# Patient Record
Sex: FEMALE | Race: WHITE | URBAN - METROPOLITAN AREA
[De-identification: names, ages, dates, MRNs, and addresses within clinical notes are randomized per-mention and may not be internally consistent; named-entity substitution may affect disease eponyms.]

---

## 2019-01-18 PROBLEM — Z90.79 S/P TOTAL HYSTERECTOMY AND BSO (BILATERAL SALPINGO-OOPHORECTOMY): Status: ACTIVE | Noted: 2019-01-18

## 2019-01-18 PROBLEM — Z90.710 S/P TOTAL HYSTERECTOMY AND BSO (BILATERAL SALPINGO-OOPHORECTOMY): Status: ACTIVE | Noted: 2019-01-18

## 2019-01-18 PROBLEM — Z90.722 S/P TOTAL HYSTERECTOMY AND BSO (BILATERAL SALPINGO-OOPHORECTOMY): Status: ACTIVE | Noted: 2019-01-18

## 2019-01-18 PROBLEM — Z92.3 HISTORY OF RADIATION THERAPY: Status: ACTIVE | Noted: 2019-01-18

## 2019-01-18 PROBLEM — Z90.710 HISTORY OF ROBOT-ASSISTED LAPAROSCOPIC HYSTERECTOMY: Status: ACTIVE | Noted: 2019-01-18

## 2019-01-18 PROBLEM — C54.1 ENDOMETRIAL CANCER (HCC): Status: ACTIVE | Noted: 2019-01-18

## 2019-03-13 PROCEDURE — 87086 URINE CULTURE/COLONY COUNT: CPT | Performed by: UROLOGY

## 2019-03-13 PROCEDURE — 81001 URINALYSIS AUTO W/SCOPE: CPT | Performed by: UROLOGY

## 2022-01-06 ENCOUNTER — TELEPHONE (OUTPATIENT)
Dept: ORTHOPEDICS CLINIC | Facility: CLINIC | Age: 67
End: 2022-01-06

## 2022-01-06 DIAGNOSIS — M25.562 LEFT KNEE PAIN, UNSPECIFIED CHRONICITY: Primary | ICD-10-CM

## 2022-01-06 NOTE — TELEPHONE ENCOUNTER
Reviewed patients chart, xray orders are required. Order placed for LT knee xrays.  Please contact patient advise to arrive 30 mins prior to patients appt to complete x-ray order and schedule patients xray appt-Thank you

## 2022-01-06 NOTE — TELEPHONE ENCOUNTER
Future Appointments   Date Time Provider Mervin Baptiste   1/19/2022  1:00 PM Joycelyn Taylor MD [facility]     Patient is coming for LT Knee Fall. No prior images done yet. Please advise if views are needed for this appt. Thanks.     P

## 2022-01-19 ENCOUNTER — HOSPITAL ENCOUNTER (OUTPATIENT)
Dept: GENERAL RADIOLOGY | Age: 67
Discharge: HOME | End: 2022-01-19
Attending: ORTHOPAEDIC SURGERY
Payer: MEDICARE

## 2022-01-19 ENCOUNTER — OFFICE VISIT (OUTPATIENT)
Dept: ORTHOPEDICS CLINIC | Facility: CLINIC | Age: 67
End: 2022-01-19
Payer: MEDICARE

## 2022-01-19 VITALS — WEIGHT: 185 LBS | BODY MASS INDEX: 32.37 KG/M2 | HEIGHT: 63.5 IN

## 2022-01-19 DIAGNOSIS — M25.561 PAIN AND SWELLING OF RIGHT KNEE: Primary | ICD-10-CM

## 2022-01-19 DIAGNOSIS — M25.562 LEFT KNEE PAIN, UNSPECIFIED CHRONICITY: ICD-10-CM

## 2022-01-19 DIAGNOSIS — M25.561 PAIN AND SWELLING OF RIGHT KNEE: ICD-10-CM

## 2022-01-19 DIAGNOSIS — M25.461 PAIN AND SWELLING OF RIGHT KNEE: Primary | ICD-10-CM

## 2022-01-19 DIAGNOSIS — T14.8XXA MUSCLE TEAR: ICD-10-CM

## 2022-01-19 DIAGNOSIS — M25.461 PAIN AND SWELLING OF RIGHT KNEE: ICD-10-CM

## 2022-01-19 PROCEDURE — 73562 X-RAY EXAM OF KNEE 3: CPT | Performed by: ORTHOPAEDIC SURGERY

## 2022-01-19 PROCEDURE — 73564 X-RAY EXAM KNEE 4 OR MORE: CPT | Performed by: ORTHOPAEDIC SURGERY

## 2022-01-19 PROCEDURE — 99203 OFFICE O/P NEW LOW 30 MIN: CPT | Performed by: ORTHOPAEDIC SURGERY
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
## 2022-05-10 ENCOUNTER — TELEPHONE (OUTPATIENT)
Dept: ORTHOPEDICS CLINIC | Facility: CLINIC | Age: 67
End: 2022-05-10

## 2022-05-10 NOTE — TELEPHONE ENCOUNTER
Spoke with pt, advised MRI order faxed to [facility] Scheduling dept, [phone]. Noted by referrals dept that auth not needed.

## 2022-06-15 ENCOUNTER — OFFICE VISIT (OUTPATIENT)
Dept: ORTHOPEDICS CLINIC | Facility: CLINIC | Age: 67
End: 2022-06-15
Payer: MEDICARE

## 2022-06-15 VITALS — HEIGHT: 63.5 IN | WEIGHT: 185 LBS | BODY MASS INDEX: 32.37 KG/M2

## 2022-06-15 DIAGNOSIS — M22.41 PATELLA, CHONDROMALACIA, RIGHT: Primary | ICD-10-CM

## 2022-06-15 DIAGNOSIS — M94.261 CHONDROMALACIA OF RIGHT KNEE: ICD-10-CM

## 2022-06-15 DIAGNOSIS — T14.8XXA MUSCLE TEAR: ICD-10-CM

## 2022-06-15 PROCEDURE — 99214 OFFICE O/P EST MOD 30 MIN: CPT | Performed by: ORTHOPAEDIC SURGERY
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
## 2022-06-23 ENCOUNTER — TELEPHONE (OUTPATIENT)
Dept: ORTHOPEDICS CLINIC | Facility: CLINIC | Age: 67
End: 2022-06-23

## 2022-08-31 ENCOUNTER — TELEPHONE (OUTPATIENT)
Dept: ORTHOPEDICS CLINIC | Facility: CLINIC | Age: 67
End: 2022-08-31

## 2023-04-03 ENCOUNTER — TELEPHONE (OUTPATIENT)
Dept: ORTHOPEDICS CLINIC | Facility: CLINIC | Age: 68
End: 2023-04-03

## 2023-04-06 ENCOUNTER — TELEPHONE (OUTPATIENT)
Dept: ORTHOPEDICS CLINIC | Facility: CLINIC | Age: 68
End: 2023-04-06

## 2023-04-06 NOTE — TELEPHONE ENCOUNTER
Called and spoke with Bright Pablo in regards to getting her scheduled for surgery. She has opted to proceed with surgery on 6/19/23. I did go over the surgical protocol with her as well as inform her that she would need to see her pcp for surgical clearance. I also informed her that she will need to see Dr Estefana Spurling prior to her surgical date. She has been scheduled to see him on 5/10.

## 2023-04-06 NOTE — TELEPHONE ENCOUNTER
Patient is following-up again to set-up her surgery, she said this is urgent and patient is anxious. She was told by Dr. Rock Littlejohn to call and set it up. Please advise, Thanks.     Patient can be reached at [phone]

## 2023-04-17 NOTE — TELEPHONE ENCOUNTER
SURGERY SCHEDULING SHEET    Prerna Treadwell  3/28/1955  [id number]    Procedure: Right  Knee Arthroscopy and Chondroplasty (89542) patella     Diagnosis:    Patella, chondromalacia, right M22.41     Chondromalacia of right knee M94.261      Anesthesia: General    Length of Surgery: 1 hr    Disposition: Outpatient    Special Equipment: NONE    Assist: Yes ESTEE JONES    Pre-op Testing: PER ANESTHESIA GUIDELINES    Clearance: HISTORY AND PHYSICAL    Post op: Day after surgery    Ankush Qureshi MD  [address] Orthopedic Surgery  Phone: [phone]  Fax: [phone]

## 2023-06-09 ENCOUNTER — OFFICE VISIT (OUTPATIENT)
Dept: ORTHOPEDICS CLINIC | Facility: CLINIC | Age: 68
End: 2023-06-09
Payer: MEDICARE

## 2023-06-09 VITALS — WEIGHT: 185 LBS | BODY MASS INDEX: 32.37 KG/M2 | HEIGHT: 63.5 IN

## 2023-06-09 DIAGNOSIS — M22.41 PATELLA, CHONDROMALACIA, RIGHT: Primary | ICD-10-CM

## 2023-06-09 DIAGNOSIS — M94.261 CHONDROMALACIA OF RIGHT KNEE: ICD-10-CM

## 2023-06-09 PROCEDURE — 99214 OFFICE O/P EST MOD 30 MIN: CPT | Performed by: ORTHOPAEDIC SURGERY

## 2023-06-09 NOTE — PROGRESS NOTES
Patient is a 27-year-old white female here for preoperative discussion regarding her upcoming surgery. I last saw the patient about a year ago and at that time had recommended considering an arthroscopy of her knee because of some persistent pain. Is my diagnosis at that time that she had some posttraumatic patellar chondromalacia that might benefit from an arthroscopy. Patient states that she continues to have some pain of the knee and also a feeling of giving out or giving way of the knee. Previous recommendations were for an arthroscopy with chondroplasty of the patella and possible lateral retinacular release. Patient has had previous trauma to the right knee on the medial aspect where she had significant scarring due to the an injury that occurred quite a while ago. Patient's exam shows her to have significant scarring on the anteromedial aspect of the right knee. Patella is noted to track fairly well. There is some mild crepitation of the patellofemoral joint. Ligaments are stable. Lachman's negative. Range of motion shows near full extension and flexion about 130 degrees. Gait is nonantalgic. Impression is that of chondromalacia of the patella posttraumatic right knee. Second impression is that of chondromalacia medial femoral condyle right knee. Third impression is that of mild lateral patellar subluxation of the right knee. Recommendations are to go ahead with an arthroscopy of the right knee. Will address adhesions within the knee as necessary. Might also consider a lateral retinacular release of the knee depending upon intraoperative findings. Patient did have a thought that we were thinking of performing an anterior cruciate ligament reconstruction I advised her that that would not be happening or nor was it necessary. Patient understands and agrees to proceed with surgery.

## 2023-06-16 ENCOUNTER — ANESTHESIA EVENT (OUTPATIENT)
Dept: SURGERY | Facility: HOSPITAL | Age: 68
End: 2023-06-16
Payer: MEDICARE

## 2023-06-19 ENCOUNTER — HOSPITAL ENCOUNTER (OUTPATIENT)
Facility: HOSPITAL | Age: 68
Setting detail: HOSPITAL OUTPATIENT SURGERY
Discharge: HOME | End: 2023-06-19
Attending: ORTHOPAEDIC SURGERY | Admitting: ORTHOPAEDIC SURGERY
Payer: MEDICARE

## 2023-06-19 ENCOUNTER — ANESTHESIA (OUTPATIENT)
Dept: SURGERY | Facility: HOSPITAL | Age: 68
End: 2023-06-19
Payer: MEDICARE

## 2023-06-19 VITALS
BODY MASS INDEX: 34.71 KG/M2 | RESPIRATION RATE: 18 BRPM | WEIGHT: 198.38 LBS | DIASTOLIC BLOOD PRESSURE: 70 MMHG | SYSTOLIC BLOOD PRESSURE: 148 MMHG | HEIGHT: 63.5 IN | TEMPERATURE: 98 F | OXYGEN SATURATION: 99 % | HEART RATE: 82 BPM

## 2023-06-19 DIAGNOSIS — Z98.890 STATUS POST ARTHROSCOPY OF RIGHT KNEE: Primary | ICD-10-CM

## 2023-06-19 LAB
GLUCOSE BLD-MCNC: 218 MG/DL (ref 70–99)
GLUCOSE BLD-MCNC: 283 MG/DL (ref 70–99)

## 2023-06-19 PROCEDURE — 0SBC4ZZ EXCISION OF RIGHT KNEE JOINT, PERCUTANEOUS ENDOSCOPIC APPROACH: ICD-10-PCS | Performed by: ORTHOPAEDIC SURGERY

## 2023-06-19 PROCEDURE — 82962 GLUCOSE BLOOD TEST: CPT

## 2023-06-19 RX ORDER — HYDROMORPHONE HYDROCHLORIDE 1 MG/ML
0.6 INJECTION, SOLUTION INTRAMUSCULAR; INTRAVENOUS; SUBCUTANEOUS EVERY 5 MIN PRN
Status: DISCONTINUED | OUTPATIENT
Start: 2023-06-19 | End: 2023-06-19

## 2023-06-19 RX ORDER — CEFAZOLIN SODIUM/WATER 2 G/20 ML
2 SYRINGE (ML) INTRAVENOUS ONCE
Status: COMPLETED | OUTPATIENT
Start: 2023-06-19 | End: 2023-06-19

## 2023-06-19 RX ORDER — INSULIN ASPART 100 [IU]/ML
INJECTION, SOLUTION INTRAVENOUS; SUBCUTANEOUS ONCE
Status: COMPLETED | OUTPATIENT
Start: 2023-06-19 | End: 2023-06-19

## 2023-06-19 RX ORDER — DEXTROSE MONOHYDRATE 25 G/50ML
50 INJECTION, SOLUTION INTRAVENOUS
Status: DISCONTINUED | OUTPATIENT
Start: 2023-06-19 | End: 2023-06-19 | Stop reason: HOSPADM

## 2023-06-19 RX ORDER — METOCLOPRAMIDE HYDROCHLORIDE 5 MG/ML
10 INJECTION INTRAMUSCULAR; INTRAVENOUS EVERY 8 HOURS PRN
Status: DISCONTINUED | OUTPATIENT
Start: 2023-06-19 | End: 2023-06-19

## 2023-06-19 RX ORDER — ACETAMINOPHEN 500 MG
1000 TABLET ORAL ONCE AS NEEDED
Status: DISCONTINUED | OUTPATIENT
Start: 2023-06-19 | End: 2023-06-19

## 2023-06-19 RX ORDER — SODIUM CHLORIDE, SODIUM LACTATE, POTASSIUM CHLORIDE, CALCIUM CHLORIDE 600; 310; 30; 20 MG/100ML; MG/100ML; MG/100ML; MG/100ML
INJECTION, SOLUTION INTRAVENOUS CONTINUOUS
Status: DISCONTINUED | OUTPATIENT
Start: 2023-06-19 | End: 2023-06-19

## 2023-06-19 RX ORDER — HYDROCODONE BITARTRATE AND ACETAMINOPHEN 5; 325 MG/1; MG/1
1 TABLET ORAL ONCE AS NEEDED
Status: DISCONTINUED | OUTPATIENT
Start: 2023-06-19 | End: 2023-06-19

## 2023-06-19 RX ORDER — BUPIVACAINE HYDROCHLORIDE 5 MG/ML
INJECTION, SOLUTION EPIDURAL; INTRACAUDAL AS NEEDED
Status: DISCONTINUED | OUTPATIENT
Start: 2023-06-19 | End: 2023-06-19 | Stop reason: HOSPADM

## 2023-06-19 RX ORDER — NALOXONE HYDROCHLORIDE 0.4 MG/ML
80 INJECTION, SOLUTION INTRAMUSCULAR; INTRAVENOUS; SUBCUTANEOUS AS NEEDED
Status: DISCONTINUED | OUTPATIENT
Start: 2023-06-19 | End: 2023-06-19

## 2023-06-19 RX ORDER — KETOROLAC TROMETHAMINE 30 MG/ML
INJECTION, SOLUTION INTRAMUSCULAR; INTRAVENOUS AS NEEDED
Status: DISCONTINUED | OUTPATIENT
Start: 2023-06-19 | End: 2023-06-19 | Stop reason: SURG

## 2023-06-19 RX ORDER — HYDROCODONE BITARTRATE AND ACETAMINOPHEN 5; 325 MG/1; MG/1
1 TABLET ORAL EVERY 4 HOURS PRN
Qty: 30 TABLET | Refills: 0 | Status: SHIPPED | OUTPATIENT
Start: 2023-06-19

## 2023-06-19 RX ORDER — INSULIN ASPART 100 [IU]/ML
4 INJECTION, SOLUTION INTRAVENOUS; SUBCUTANEOUS ONCE
Status: COMPLETED | OUTPATIENT
Start: 2023-06-19 | End: 2023-06-19

## 2023-06-19 RX ORDER — POVIDONE-IODINE 10 MG/G
OINTMENT TOPICAL AS NEEDED
Status: DISCONTINUED | OUTPATIENT
Start: 2023-06-19 | End: 2023-06-19 | Stop reason: HOSPADM

## 2023-06-19 RX ORDER — HYDROCODONE BITARTRATE AND ACETAMINOPHEN 5; 325 MG/1; MG/1
2 TABLET ORAL ONCE AS NEEDED
Status: DISCONTINUED | OUTPATIENT
Start: 2023-06-19 | End: 2023-06-19

## 2023-06-19 RX ORDER — INSULIN ASPART 100 [IU]/ML
INJECTION, SOLUTION INTRAVENOUS; SUBCUTANEOUS
Status: COMPLETED
Start: 2023-06-19 | End: 2023-06-19

## 2023-06-19 RX ORDER — HYDROMORPHONE HYDROCHLORIDE 1 MG/ML
0.4 INJECTION, SOLUTION INTRAMUSCULAR; INTRAVENOUS; SUBCUTANEOUS EVERY 5 MIN PRN
Status: DISCONTINUED | OUTPATIENT
Start: 2023-06-19 | End: 2023-06-19

## 2023-06-19 RX ORDER — METOCLOPRAMIDE HYDROCHLORIDE 5 MG/ML
INJECTION INTRAMUSCULAR; INTRAVENOUS AS NEEDED
Status: DISCONTINUED | OUTPATIENT
Start: 2023-06-19 | End: 2023-06-19 | Stop reason: SURG

## 2023-06-19 RX ORDER — NICOTINE POLACRILEX 4 MG
30 LOZENGE BUCCAL
Status: DISCONTINUED | OUTPATIENT
Start: 2023-06-19 | End: 2023-06-19 | Stop reason: HOSPADM

## 2023-06-19 RX ORDER — HYDROMORPHONE HYDROCHLORIDE 1 MG/ML
0.2 INJECTION, SOLUTION INTRAMUSCULAR; INTRAVENOUS; SUBCUTANEOUS EVERY 5 MIN PRN
Status: DISCONTINUED | OUTPATIENT
Start: 2023-06-19 | End: 2023-06-19

## 2023-06-19 RX ORDER — ONDANSETRON 2 MG/ML
INJECTION INTRAMUSCULAR; INTRAVENOUS AS NEEDED
Status: DISCONTINUED | OUTPATIENT
Start: 2023-06-19 | End: 2023-06-19 | Stop reason: SURG

## 2023-06-19 RX ORDER — ONDANSETRON 2 MG/ML
4 INJECTION INTRAMUSCULAR; INTRAVENOUS EVERY 6 HOURS PRN
Status: DISCONTINUED | OUTPATIENT
Start: 2023-06-19 | End: 2023-06-19

## 2023-06-19 RX ORDER — LABETALOL HYDROCHLORIDE 5 MG/ML
5 INJECTION, SOLUTION INTRAVENOUS EVERY 5 MIN PRN
Status: DISCONTINUED | OUTPATIENT
Start: 2023-06-19 | End: 2023-06-19

## 2023-06-19 RX ORDER — MIDAZOLAM HYDROCHLORIDE 1 MG/ML
1 INJECTION INTRAMUSCULAR; INTRAVENOUS EVERY 5 MIN PRN
Status: DISCONTINUED | OUTPATIENT
Start: 2023-06-19 | End: 2023-06-19

## 2023-06-19 RX ORDER — MEPERIDINE HYDROCHLORIDE 25 MG/ML
12.5 INJECTION INTRAMUSCULAR; INTRAVENOUS; SUBCUTANEOUS AS NEEDED
Status: DISCONTINUED | OUTPATIENT
Start: 2023-06-19 | End: 2023-06-19

## 2023-06-19 RX ORDER — NICOTINE POLACRILEX 4 MG
15 LOZENGE BUCCAL
Status: DISCONTINUED | OUTPATIENT
Start: 2023-06-19 | End: 2023-06-19 | Stop reason: HOSPADM

## 2023-06-19 RX ORDER — ACETAMINOPHEN 500 MG
1000 TABLET ORAL ONCE
Status: DISCONTINUED | OUTPATIENT
Start: 2023-06-19 | End: 2023-06-19 | Stop reason: HOSPADM

## 2023-06-19 RX ORDER — LIDOCAINE HYDROCHLORIDE 10 MG/ML
INJECTION, SOLUTION EPIDURAL; INFILTRATION; INTRACAUDAL; PERINEURAL AS NEEDED
Status: DISCONTINUED | OUTPATIENT
Start: 2023-06-19 | End: 2023-06-19 | Stop reason: SURG

## 2023-06-19 RX ADMIN — METOCLOPRAMIDE HYDROCHLORIDE 10 MG: 5 INJECTION INTRAMUSCULAR; INTRAVENOUS at 10:14:00

## 2023-06-19 RX ADMIN — SODIUM CHLORIDE, SODIUM LACTATE, POTASSIUM CHLORIDE, CALCIUM CHLORIDE: 600; 310; 30; 20 INJECTION, SOLUTION INTRAVENOUS at 10:03:00

## 2023-06-19 RX ADMIN — LIDOCAINE HYDROCHLORIDE 50 MG: 10 INJECTION, SOLUTION EPIDURAL; INFILTRATION; INTRACAUDAL; PERINEURAL at 10:08:00

## 2023-06-19 RX ADMIN — KETOROLAC TROMETHAMINE 30 MG: 30 INJECTION, SOLUTION INTRAMUSCULAR; INTRAVENOUS at 10:55:00

## 2023-06-19 RX ADMIN — SODIUM CHLORIDE, SODIUM LACTATE, POTASSIUM CHLORIDE, CALCIUM CHLORIDE: 600; 310; 30; 20 INJECTION, SOLUTION INTRAVENOUS at 10:59:00

## 2023-06-19 RX ADMIN — CEFAZOLIN SODIUM/WATER 2 G: 2 G/20 ML SYRINGE (ML) INTRAVENOUS at 10:08:00

## 2023-06-19 RX ADMIN — ONDANSETRON 4 MG: 2 INJECTION INTRAMUSCULAR; INTRAVENOUS at 10:55:00

## 2023-06-19 NOTE — H&P
The above referenced H&P by Dr. Isabel Briceño dated 6/8/2023 was reviewed by Aayush Salmeron MD on 6/19/2023, the patient was examined and no significant changes have occurred in the patient's condition since the H&P was performed. Risks, benefits, alternative treatments and consequences of no treatment were discussed. We will proceed with procedure right knee arthroscopy for chondromalacia patella as planned.       Aayush Salmeron MD  6/19/2023  9:44 AM

## 2023-06-19 NOTE — OPERATIVE REPORT
[facility]    PATIENT'S NAME: Larry Thomas   ATTENDING PHYSICIAN: Dennie Malay, M.D. OPERATING PHYSICIAN: Dennie Malay, M.D. PATIENT ACCOUNT#:   [de-identified]    LOCATION:  [address] [place] [address] 10  MEDICAL RECORD #:   [id number]       YOB: 1955  ADMISSION DATE:       06/19/2023      OPERATION DATE:  06/19/2023    OPERATIVE REPORT    PREOPERATIVE DIAGNOSIS:  Medial meniscus tear, chondromalacia of medial femoral condyle and patellofemoral joint, right knee. POSTOPERATIVE DIAGNOSIS:  Medial and lateral meniscal tears; chondromalacia of the medial femoral condyle, patella and trochlea; suprapatellar plica; lateral meniscus tear; right knee. PROCEDURE:  Arthroscopy of the right knee, endoscopic partial medial and lateral meniscectomies, chondroplasty of the medial femoral condyle, chondroplasty of the patella and trochlea, debridement of suprapatellar plica. ASSISTANT:  Janie Rodríguez PA-C     ANESTHESIA:  General.      OPERATIVE TECHNIQUE:  Following the administration of general anesthesia, tourniquet and leg de souza were placed around the proximal right thigh, and the left leg was placed in a well leg de souza. The right leg was exsanguinated with a Reza bandage, and the tourniquet was inflated to pressure 300 mmHg. Using a #11 blade, standard arthroscopic skin incisions were made, and a semi-blunt trocar and inflow cannula were inserted through the superomedial incision to the knee joint. The knee was inflated with irrigation fluid. The arthroscopic cannula and semi-blunt trocar were introduced through the anterolateral incision to the knee joint. The arthroscope was then inserted and the knee was examined beginning at the suprapatellar pouch. The pouch itself was free of loose bodies. The suprapatellar pouch did demonstrate a moderately broad suprapatellar plica. The patella and trochlea demonstrated diffuse chondromalacia, grade 2 to 3.   No subchondral bone was exposed. The gutters were free of loose bodies. No significant shelf was present. The medial joint line was examined, and a spinal needle was placed superior to the anterior horn of the medial meniscus. A #11 blade was used to make a 7 mm skin incision at this site. Semi-blunt trocar was used to establish a portal, and a probe was inserted. The medial meniscus was examined and demonstrated a horizontal cleavage tear of the posterior horn. The adjacent articular cartilage demonstrated some chondromalacia in an area about of 1 cm to 1.5 cm in diameter. Surrounding articular cartilage was intact. The intercondylar notch was examined. The anterior and posterior cruciate ligaments were intact. The lateral joint line was then examined, and the lateral femoral condyle and tibial plateau were intact. There was a tear of the free edge of the meniscus laterally. This was more of a frayed edge. The basket forceps were used at this time to perform a partial medial meniscectomy, removing a portion of the posterior horn of the meniscus and then beveled into the normal contour of the meniscus in the midportion. Additionally, the femoral condyle was also abraded with a shaver to stable margins. No subchondral bone was exposed. Attention was then paid to the lateral meniscus, and the meniscus was debrided through the anteromedial approach. After this was completed, attention was then paid to the patella and trochlea, and these were debrided through anteromedial and anterolateral approaches. No subchondral bone was exposed. Suprapatellar plica was then debrided as well with the shaver. The knee was then irrigated free of any loose debris. The wounds were closed with 3-0 nylon sutures in a horizontal mattress fashion. The wounds were covered with the Betadine ointment, Adaptic gauze, 4 x 4 gauze, ABD dressing, sterile Webril, cotton roll, and Ace bandage.   Tourniquet was deflated after less than 1 hour of tourniquet time. No complications. No specimens. Blood loss, 10 mL. IV fluids, all crystalloid. The patient transferred to postanesthesia recovery room in stable condition.      Dictated By Charlene Mike M.D.  d: 06/19/2023 11:04:41  t: 06/19/2023 14:04:39  [facility] [id number]  QB/

## 2023-06-19 NOTE — BRIEF OP NOTE
Pre-Operative Diagnosis: Patella, chondromalacia, right [M22.41]  Chondromalacia of right knee [M94.261]     Post-Operative Diagnosis: Right knee medial and lateral meniscus tears, chondromalacia medial femoral condyle, patella, and trochlea and suprapatellar plica      Procedure Performed:   RIGHT KNEE PARTIAL MEDIAL AND LATERAL MENISCECTOMY, CHONDROPLASTY MEDIAL FEMORAL CONDYLE, PATELLA AND TROCHLEA, AND DEBRIDEMENT OF SUPRAPATELLAR PLICA     Surgeon(s) and Role:     Demi Keller MD - Primary    Assistant(s):  PA: Hiwot Severino PA-C and Ladi FONTANEZ     Surgical Findings: See operative report      Specimen: na      Estimated Blood Loss: Blood Output: 10 mL (6/19/2023 10:56 AM)        Mir Bradshaw PA-C  6/19/2023  11:09 AM

## 2023-06-19 NOTE — ANESTHESIA PROCEDURE NOTES
Airway  Date/Time: 6/19/2023 10:09 AM  Urgency: elective    Airway not difficult    General Information and Staff    Patient location during procedure: OR  Anesthesiologist: Sage Bartholomew MD  Resident/CRNA: Gabbi Fletcher CRNA  Performed: CRNA   Performed by: Gabbi Fletcher CRNA  Authorized by: Sage Bartholomew MD      Indications and Patient Condition  Indications for airway management: anesthesia  Spontaneous Ventilation: absent  Sedation level: deep  Preoxygenated: yes  Patient position: sniffing  Mask difficulty assessment: 0 - not attempted    Final Airway Details  Final airway type: supraglottic airway      Successful airway: classic  Size 3       Number of attempts at approach: 1  Number of other approaches attempted: 0    Additional Comments  Dentition per pre op

## 2023-06-19 NOTE — ANESTHESIA POSTPROCEDURE EVALUATION
[facility] Patient Status:  Hospital Outpatient Surgery   Age/Gender 76year old female MRN [id number]   [facility] SURGERY Attending Florecita Wilder MD   Hosp Day # 0 PCP CLEM CHLIDRESS       Anesthesia Post-op Note    RIGHT  PARTYIAL KNEE ARTHROSCOPY,,  Scot Cargo,  , AND CHONDROPLASTY OF THE Venia Morse, PATELLA AND TROCCHLEAR, DEBRIDEMENT OF SUPER PATELLA    Procedure Summary     Date: 06/19/23 Room / Location: [facility] OR 14 / [facility] OR    Anesthesia Start: 1003 Anesthesia Stop: 1109    Procedure: RIGHT  PARTYIAL KNEE ARTHROSCOPY,,  Scot Cargo,  , AND CHONDROPLASTY OF THE Venia Morse, PATELLA AND TROCCHLEAR, DEBRIDEMENT OF SUPER PATELLA (Right: Knee) Diagnosis:       Patella, chondromalacia, right      Chondromalacia of right knee      (Patella, chondromalacia, right [M22.41]Chondromalacia of right knee [U28.990])    Surgeons: Florecita Wilder MD Anesthesiologist: Kel Branch MD    Anesthesia Type: general ASA Status: 2          Anesthesia Type: general    Vitals Value Taken Time   /62 06/19/23 1109   Temp 98 06/19/23 1109   Pulse 80 06/19/23 1109   Resp 12 06/19/23 1109   SpO2 97 06/19/23 1109       Patient Location: PACU    Anesthesia Type: general    Airway Patency: patent and extubated    Postop Pain Control: adequate    Mental Status: preanesthetic baseline    Nausea/Vomiting: none    Cardiopulmonary/Hydration status: stable euvolemic    Complications: no apparent anesthesia related complications    Postop vital signs: stable    Dental Exam: Unchanged from Preop    Patient to be discharged from PACU when criteria met.

## 2023-06-19 NOTE — DISCHARGE INSTRUCTIONS
Post-operative Knee Arthroscopy    Medication: Before you leave the hospital, you will be given a prescription for a pain reliever. This medication contains a narcotic with acetaminophen (Tylenol), so do not take any additional Tylenol. You may take Tylenol or Ibuprofen instead of the prescription as the pain subsides. If you take a daily Aspirin you may resume taking your Aspirin the evening of surgery. Day of Surgery: When you return home, elevate the extremity on some pillows, if applicable. Place an ice bag over the dressing for about 20 minutes three or four times a day for the first 5 days while protecting the skin with a sterile dressing plus Ace wrap (for knee). Dressings: The dressing should remain in place for 48 hours. After 48 hours, remove the bulky Ace wrap and the gauze dressings. Apply a small amount of Betadine ointment to sutures and cover with a breathable band-aid. Showering: Before showering, apply waterproof band-aids to sutures to ensure these areas do not get wet. Once finished, remove band-aids, let area air dry, and apply another small amount of Betadine ointment and regular band-aids. Do not immerse or soak the surgical wound (no baths). You may continue to use the Ace bandage if it makes you more comfortable. Activity: You may walk on the leg as tolerated, unless instructed differently. You may use crutches or a cane as needed to minimize discomfort. For knee arthroscopy: Flex and extend the knee, foot, and ankle to full range of motion as soon as possible to prevent stiffness. Follow up: You will be scheduled for a follow up office visit tomorrow and in 7-10 days to have your sutures taken out. Your plan for physical therapy, driving, and returning to work will be discussed at this appointment. Please don't hesitate to contact our office at [phone] if you have any post-operative questions or concerns.        You received a drug called Toradol which is an Anti Inflammatory at: 11am  If you are allowed to take Anti inflammatories,  Do not take any Anti Inflammatory like Motrin, Aleve or Ibuprofen until after: 5pm  Please report any suspected allergic reactions or bleeding issues to your doctor      You have been given a prescription for Norco 5/325  Next dose due: Anytime   Take this medication as directed  This medication contains Tylenol (acetaminophen)  Do not take additional Tylenol while taking Jaswant Geoff is a Narcotic and can be constipating or upset your stomach  Don't take Norco on an empty stomach  Drink plenty of water  Alcoholic beverages should be avoided while taking narcotics

## 2023-06-20 ENCOUNTER — OFFICE VISIT (OUTPATIENT)
Dept: ORTHOPEDICS CLINIC | Facility: CLINIC | Age: 68
End: 2023-06-20
Payer: MEDICARE

## 2023-06-20 VITALS — WEIGHT: 198 LBS | HEIGHT: 63.5 IN | BODY MASS INDEX: 34.65 KG/M2

## 2023-06-20 DIAGNOSIS — Z48.89 AFTERCARE FOLLOWING SURGERY: Primary | ICD-10-CM

## 2023-06-20 PROCEDURE — 99024 POSTOP FOLLOW-UP VISIT: CPT | Performed by: ORTHOPAEDIC SURGERY

## 2023-06-20 NOTE — PROGRESS NOTES
Patient is a 70-year-old female here for follow-up. Patient had the arthroscopy of her right knee yesterday. She had a meniscus tear as well as some chondromalacia. Patient is doing fairly well today. Minimal complaints. Patient's exam shows have mild swelling of the right knee. Sutures are in place. Straight leg raising is intact. Flexion to about 100 degrees. Negative Homans. Impression is that a normal postop arthroscopy with endoscopic surgery of her right knee. Recommendations are for her to follow-up in a week for suture removal.  Exercises were given for low repetition range of motion as well as for strengthening. Avoid any deep knee bends or squats.

## 2023-06-27 ENCOUNTER — OFFICE VISIT (OUTPATIENT)
Dept: ORTHOPEDICS CLINIC | Facility: CLINIC | Age: 68
End: 2023-06-27
Payer: MEDICARE

## 2023-06-27 VITALS — BODY MASS INDEX: 34.65 KG/M2 | WEIGHT: 198 LBS | HEIGHT: 63.5 IN

## 2023-06-27 DIAGNOSIS — Z48.89 AFTERCARE FOLLOWING SURGERY: Primary | ICD-10-CM

## 2023-06-27 PROCEDURE — 99024 POSTOP FOLLOW-UP VISIT: CPT | Performed by: ORTHOPAEDIC SURGERY

## 2023-07-26 ENCOUNTER — OFFICE VISIT (OUTPATIENT)
Dept: ORTHOPEDICS CLINIC | Facility: CLINIC | Age: 68
End: 2023-07-26
Payer: MEDICARE

## 2023-07-26 VITALS — HEIGHT: 63.5 IN | WEIGHT: 198 LBS | BODY MASS INDEX: 34.65 KG/M2

## 2023-07-26 DIAGNOSIS — Z48.89 AFTERCARE FOLLOWING SURGERY: Primary | ICD-10-CM

## 2023-07-26 DIAGNOSIS — M94.261 CHONDROMALACIA OF RIGHT KNEE: ICD-10-CM

## 2023-07-26 PROCEDURE — 99024 POSTOP FOLLOW-UP VISIT: CPT | Performed by: ORTHOPAEDIC SURGERY

## 2023-07-26 RX ORDER — SITAGLIPTIN AND METFORMIN HYDROCHLORIDE 1000; 50 MG/1; MG/1
TABLET, FILM COATED, EXTENDED RELEASE ORAL
COMMUNITY
Start: 2023-07-25

## 2023-07-26 NOTE — PROGRESS NOTES
Patient is a 40-year-old white female here for follow-up on her right knee. Patient had the arthroscopy of the knee for medial lateral meniscal tears as well as some chondromalacia. She is here complaining of some stiffness of the knee. Patient also notes some difficulties going up and down stairs. Patient's exam shows have mild swelling of the right knee. Her surgical incisions are healing well. Straight leg raising is intact. Flexion is to about 100 degrees. She does have moderate swelling of the right leg. Negative Noel. Impression is that of normal healing status post arthroscopy with endoscopic surgery of the right knee. Recommendations for to begin physical therapy for range of motion and strengthening. Follow-up with me in about 3 weeks time. Advised her to go ahead and use her compression stockings as much as possible to help improve and reduce the swelling of the leg.

## 2023-08-18 ENCOUNTER — MED REC SCAN ONLY (OUTPATIENT)
Dept: ORTHOPEDICS CLINIC | Facility: CLINIC | Age: 68
End: 2023-08-18

## 2023-09-06 ENCOUNTER — OFFICE VISIT (OUTPATIENT)
Dept: ORTHOPEDICS CLINIC | Facility: CLINIC | Age: 68
End: 2023-09-06
Payer: MEDICARE

## 2023-09-06 VITALS — WEIGHT: 198 LBS | HEIGHT: 63.5 IN | BODY MASS INDEX: 34.65 KG/M2

## 2023-09-06 DIAGNOSIS — Z48.89 AFTERCARE FOLLOWING SURGERY: Primary | ICD-10-CM

## 2023-09-06 DIAGNOSIS — M94.261 CHONDROMALACIA OF RIGHT KNEE: ICD-10-CM

## 2023-09-06 PROCEDURE — 99024 POSTOP FOLLOW-UP VISIT: CPT | Performed by: ORTHOPAEDIC SURGERY
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
## 2023-10-24 ENCOUNTER — MED REC SCAN ONLY (OUTPATIENT)
Dept: ORTHOPEDICS CLINIC | Facility: CLINIC | Age: 68
End: 2023-10-24

## 2024-01-01 ENCOUNTER — MED REC SCAN ONLY (OUTPATIENT)
Dept: ORTHOPEDICS CLINIC | Facility: CLINIC | Age: 69
End: 2024-01-01

## (undated) DEVICE — PAD,ABDOMINAL,8"X7.5",ST,LF,20/BX: Brand: MEDLINE INDUSTRIES, INC.

## (undated) DEVICE — STERILE COTTON ROLL 1LB 1X8.5

## (undated) DEVICE — AGGRESSIVE PLUS, ANGLED CUTTER: Brand: FORMULA

## (undated) DEVICE — GAMMEX® NON-LATEX SIZE 8, STERILE NEOPRENE POWDER-FREE SURGICAL GLOVE: Brand: GAMMEX

## (undated) DEVICE — [AGGRESSIVE PLUS CUTTER, ARTHROSCOPIC SHAVER BLADE,  DO NOT RESTERILIZE,  DO NOT USE IF PACKAGE IS DAMAGED,  KEEP DRY,  KEEP AWAY FROM SUNLIGHT]: Brand: FORMULA

## (undated) DEVICE — COVER,MAYO STAND,STERILE: Brand: MEDLINE

## (undated) DEVICE — DISPOSABLE TOURNIQUET CUFF SINGLE BLADDER, DUAL PORT AND QUICK CONNECT CONNECTOR: Brand: COLOR CUFF

## (undated) DEVICE — PADDING CAST COTTON  4

## (undated) DEVICE — 10K/24K ARTHROSCOPY INFLOW TUBE SET: Brand: 10K/24K

## (undated) DEVICE — CURAD OIL EMLUSION GAUZE 3X16

## (undated) DEVICE — KNEE ARTHROSCOPY CDS: Brand: MEDLINE INDUSTRIES, INC.

## (undated) DEVICE — DRAPE SURG 18X24

## (undated) DEVICE — 3M™ STERI-DRAPE™ U-DRAPE 1015: Brand: STERI-DRAPE™

## (undated) DEVICE — SLEEVE KENDALL SCD EXPRESS MED

## (undated) DEVICE — SOL LACT RINGERS 3000ML

## (undated) DEVICE — SUT ETHILON 3-0 PS-1 1663H

## (undated) NOTE — LETTER
OUTSIDE TESTING RESULT REQUEST     IMPORTANT: FOR YOUR IMMEDIATE ATTENTION  Please FAX all test results listed below to: [phone]     Testing already done on or about: 23     * * * * If testing is NOT complete, arrange with patient A.S.A.P. * * * *      Patient Name: Yelena Dior  Surgery Date: 2023  Medical Record: [id number]  CSN: [id number]  : 3/28/1955 - A: 76 y     Sex: female  Surgeon(s):  James Giron MD  Procedure: RIGHT KNEE ARTHROSCOPY, AND CHONDROPLASTY  Anesthesia Type: General     Surgeon: James Giron MD     The following Testing and Time Line are REQUIRED PER ANESTHESIA     EKG READ AND SIGNED WITHIN   90 days  BMP (requires 4 hour fast) within  90 days      Thank Augusto Wesley by:Yessi JIMENEZ